# Patient Record
(demographics unavailable — no encounter records)

---

## 2024-12-20 NOTE — HISTORY OF PRESENT ILLNESS
[4] : 4 [3] : 3 [Dull/Aching] : dull/aching [Localized] : localized [Tightness] : tightness [Intermittent] : intermittent [Leisure] : leisure [Sleep] : sleep [Rest] : rest [Standing] : standing [Walking] : walking [Stairs] : stairs [de-identified] : Patient is here to follow up on Both knees. Patient states they are feeling more tightness due to the weather. [] : no [FreeTextEntry1] : Jase Knee [de-identified] : Dr. Linton

## 2024-12-20 NOTE — PHYSICAL EXAM
[Bilateral] : knee bilaterally [NL (0)] : extension 0 degrees [5___] : hamstring 5[unfilled]/5 [Negative] : negative Torie's [] : patient ambulates with assistive device [FreeTextEntry8] : slight swelling posterior lt knee  [TWNoteComboBox7] : flexion 120 degrees

## 2024-12-20 NOTE — DISCUSSION/SUMMARY
[de-identified] : General Discussion The patient was advised of the diagnosis.  The natural history of the pathology was explained in full to the patient in layman's terms. All questions were answered.  The risks and benefits of surgical and non-surgical treatment alternatives were explained in full to the patient  Case Discussed. will ont HEP  will try glucos/chond  f/u prn